# Patient Record
Sex: MALE | Race: WHITE | Employment: UNEMPLOYED | URBAN - METROPOLITAN AREA
[De-identification: names, ages, dates, MRNs, and addresses within clinical notes are randomized per-mention and may not be internally consistent; named-entity substitution may affect disease eponyms.]

---

## 2024-09-10 ENCOUNTER — OFFICE VISIT (OUTPATIENT)
Dept: DENTISTRY | Facility: CLINIC | Age: 29
End: 2024-09-10

## 2024-09-10 ENCOUNTER — PATIENT OUTREACH (OUTPATIENT)
Dept: DENTISTRY | Facility: CLINIC | Age: 29
End: 2024-09-10

## 2024-09-10 ENCOUNTER — TELEPHONE (OUTPATIENT)
Dept: DENTISTRY | Facility: CLINIC | Age: 29
End: 2024-09-10

## 2024-09-10 VITALS — TEMPERATURE: 99.1 F | HEART RATE: 84 BPM | SYSTOLIC BLOOD PRESSURE: 127 MMHG | DIASTOLIC BLOOD PRESSURE: 83 MMHG

## 2024-09-10 DIAGNOSIS — K08.9 EXTRACTION OF TOOTH NEEDED: Primary | ICD-10-CM

## 2024-09-10 DIAGNOSIS — Z59.82 TRANSPORTATION INSECURITY: Primary | ICD-10-CM

## 2024-09-10 PROCEDURE — D0220 INTRAORAL - PERIAPICAL FIRST RADIOGRAPHIC IMAGE: HCPCS

## 2024-09-10 PROCEDURE — D0230 INTRAORAL - PERIAPICAL EACH ADDITIONAL RADIOGRAPHIC IMAGE: HCPCS

## 2024-09-10 PROCEDURE — D7140 EXTRACTION, ERUPTED TOOTH OR EXPOSED ROOT (ELEVATION AND/OR FORCEPS REMOVAL): HCPCS

## 2024-09-10 PROCEDURE — D0140 LIMITED ORAL EVALUATION - PROBLEM FOCUSED: HCPCS

## 2024-09-10 SDOH — ECONOMIC STABILITY - TRANSPORTATION SECURITY: TRANSPORTATION INSECURITY: Z59.82

## 2024-09-10 NOTE — PROGRESS NOTES
MINH TREVIÑO received request to assist with transportation to appointment today 9/10 at 3:30. Transportation could not be arranged through [employer]. MINH TREVIÑO was advised that patient was already contact and addressed confirmed.     MINH TREVIÑO contact [facility]'s transportation who sent the Lyft/[employer] to  patient now.     As appointment time is soon, MINH TREVIÑO will outreach patient at a later date to further assess psychosocial needs.

## 2024-09-10 NOTE — PROGRESS NOTES
Dental procedures in this visit     - LIMITED ORAL EVALUATION - PROBLEM FOCUSED (Completed)     Service provider: Stan Naranjo DMD     Billing provider: Stan Naranjo DMD     - INTRAORAL - PERIAPICAL FIRST RADIOGRAPHIC IMAGE 12 (Completed)     Service provider: Stan Naranjo DMD     Billing provider: Stan Naranjo DMD     - INTRAORAL - PERIAPICAL EACH ADDITIONAL RADIOGRAPHIC IMAGE 15 (Completed)     Service provider: Stan Naranjo DMD     Billing provider: Stan Naranjo DMD     - EXTRACTION, ERUPTED TOOTH OR EXPOSED ROOT (ELEVATION AND/OR FORCEPS REMOVAL) 14 (Completed)     Service provider: Stan Naranjo DMD     Billing provider: Stan Naranjo DMD     - EXTRACTION, ERUPTED TOOTH OR EXPOSED ROOT (ELEVATION AND/OR FORCEPS REMOVAL) 15 (Completed)     Service provider: Stan Naranjo DMD     Billing provider: Stan Naranjo DMD     Subjective   Patient ID: Maksim Cuevas is a 29 y.o. male.  Chief Complaint   Patient presents with    Emergency/limited Exam     HPI  The following portions of the chart were reviewed this encounter and updated as appropriate:    Meds  Problems  Med Hx  Surg Hx  Fam Hx           Objective   Soft Tissue Exam  No findings documented this visit      Dental Exam    Radiographic Interpretation:   Associated radiographs for today's visit were reviewed and finding(s) were discussed with the patient.   Findings include: PA tooth #12, #14, and #15 grossly carious  Hard Tissue Exam:  Gross/rampant decay  Reference tooth chart for additional findings.    Assessment & Plan   Problem List Items Addressed This Visit    None  Visit Diagnoses       Extraction of tooth needed    -  Primary    Relevant Orders    LIMITED ORAL EVALUATION - PROBLEM FOCUSED (Completed)    14 EXTRACTION, ERUPTED TOOTH OR EXPOSED ROOT (ELEVATION AND/OR FORCEPS REMOVAL) (Completed)    15 EXTRACTION,  "ERUPTED TOOTH OR EXPOSED ROOT (ELEVATION AND/OR FORCEPS REMOVAL) (Completed)    Ambulatory referral to Oral Maxillofacial Surgery    14 INTRAORAL - PERIAPICAL FIRST RADIOGRAPHIC IMAGE (Completed)    15 INTRAORAL - PERIAPICAL EACH ADDITIONAL RADIOGRAPHIC IMAGE (Completed)          28 yo male present with chief complaint: \"My tooth on the upper left really bothers me. It's the root tips before the last tooth.\" Upon clinical and radiographic exam, #12, 14, 15 grossly carious and warrant extraction. #12 deem too difficult to complete here, as such an OS referral for this tooth was provided along with x-ray and provider sheet. #14 and 15 were extracted per the follow protocol:    Oral Surgery    Maksim Cuevas presents for Ext #14, 15    RPMH, patient denies any changes. Obtained a direct and personal consent. Risks and complications were explained. Pt agreed and consented. Consent scanned in doc center.    Pre-Op BP WNL.     Administered 2 cc of 4% septocaine w/ 1:100,000 epi via buccal and palatal infiltration. ?  Adequate anesthesia obtained, reflected gingiva, elevated, and extracted #14, 15 . Socket irrigated, and 4.0 chromic gut sutures placed.     Upon dismissal, patient received POI, ice, gauze.    NV: OS referral / comp exam if interested        "

## 2024-09-18 ENCOUNTER — PATIENT OUTREACH (OUTPATIENT)
Dept: DENTISTRY | Facility: CLINIC | Age: 29
End: 2024-09-18

## 2024-09-18 NOTE — PROGRESS NOTES
MINH TREVIÑO noted in chart that patient attended 9/10 appointment. MINH TREVIÑO noted patient does not have insurance coverage. MINH TREVIÑO noted in guarantor account that the application for sliding fee scale is pending.     MINH TREVIÑO placed call to the patient, Maksim utilizing [employer] interpret[employer] #[id number] and discussed referral. He does not drive and typically has trouble getting to appointments. He is unfamiliar with the area as [place] near bus route but it takes him an hour to get to the appointment. He reported he does not need follow up appointment at this time.     MINH TREVIÑO and Maksim discussed primary care and he expressed interest. MINH TREVIÑO explained SHANELL has a PCP as well which would be under the same payment structure as his dental. He expressed understanding.     Maksim lives with aunt and is not currently working. It has been difficult for him to not be contributing to bills. He reported food security. He does not have work permit. He has resided in the [place] 8 months and is receptive to immigration resources.     MINH TREVIÑO sent the following information via [employer]:    Primary/Family Medicine ([facility])  Immigration Services ([employer])  [employer]     MINH TREVIÑO will close referral as requested information was provided. MINH TREVIÑO will remain available for psychosocial support as needed.